# Patient Record
Sex: FEMALE | Race: OTHER
[De-identification: names, ages, dates, MRNs, and addresses within clinical notes are randomized per-mention and may not be internally consistent; named-entity substitution may affect disease eponyms.]

---

## 2022-01-07 ENCOUNTER — HOSPITAL ENCOUNTER (EMERGENCY)
Dept: HOSPITAL 15 - ER | Age: 52
Discharge: HOME | End: 2022-01-07
Payer: COMMERCIAL

## 2022-01-07 VITALS — WEIGHT: 160 LBS | BODY MASS INDEX: 29.44 KG/M2 | HEIGHT: 62 IN

## 2022-01-07 VITALS — SYSTOLIC BLOOD PRESSURE: 140 MMHG | DIASTOLIC BLOOD PRESSURE: 100 MMHG

## 2022-01-07 DIAGNOSIS — Z20.822: ICD-10-CM

## 2022-01-07 DIAGNOSIS — I10: Primary | ICD-10-CM

## 2022-01-07 PROCEDURE — 36415 COLL VENOUS BLD VENIPUNCTURE: CPT

## 2022-01-07 PROCEDURE — 87426 SARSCOV CORONAVIRUS AG IA: CPT

## 2025-02-27 ENCOUNTER — HOSPITAL ENCOUNTER (INPATIENT)
Dept: HOSPITAL 15 - ER | Age: 55
LOS: 2 days | Discharge: HOME | DRG: 48 | End: 2025-03-01
Attending: STUDENT IN AN ORGANIZED HEALTH CARE EDUCATION/TRAINING PROGRAM | Admitting: STUDENT IN AN ORGANIZED HEALTH CARE EDUCATION/TRAINING PROGRAM
Payer: MEDICAID

## 2025-02-27 VITALS — HEART RATE: 76 BPM | RESPIRATION RATE: 16 BRPM | OXYGEN SATURATION: 96 %

## 2025-02-27 VITALS
OXYGEN SATURATION: 95 % | RESPIRATION RATE: 18 BRPM | DIASTOLIC BLOOD PRESSURE: 96 MMHG | TEMPERATURE: 98.6 F | SYSTOLIC BLOOD PRESSURE: 152 MMHG | HEART RATE: 76 BPM

## 2025-02-27 VITALS — OXYGEN SATURATION: 96 % | RESPIRATION RATE: 20 BRPM | HEART RATE: 85 BPM

## 2025-02-27 VITALS — WEIGHT: 178.57 LBS | BODY MASS INDEX: 33.71 KG/M2 | HEIGHT: 61 IN

## 2025-02-27 DIAGNOSIS — Z79.899: ICD-10-CM

## 2025-02-27 DIAGNOSIS — E03.9: ICD-10-CM

## 2025-02-27 DIAGNOSIS — G90.9: Primary | ICD-10-CM

## 2025-02-27 DIAGNOSIS — Z88.8: ICD-10-CM

## 2025-02-27 DIAGNOSIS — E86.9: ICD-10-CM

## 2025-02-27 DIAGNOSIS — E87.6: ICD-10-CM

## 2025-02-27 DIAGNOSIS — E86.1: ICD-10-CM

## 2025-02-27 DIAGNOSIS — J01.90: ICD-10-CM

## 2025-02-27 DIAGNOSIS — I10: ICD-10-CM

## 2025-02-27 DIAGNOSIS — Z20.822: ICD-10-CM

## 2025-02-27 DIAGNOSIS — N39.0: ICD-10-CM

## 2025-02-27 DIAGNOSIS — E86.0: ICD-10-CM

## 2025-02-27 DIAGNOSIS — E11.9: ICD-10-CM

## 2025-02-27 LAB
ALBUMIN SERPL-MCNC: 4.8 G/DL (ref 3.2–4.8)
ALP SERPL-CCNC: 50 U/L (ref 46–116)
ALT SERPL-CCNC: 23 U/L (ref 7–40)
ANION GAP SERPL CALCULATED.3IONS-SCNC: 9 MMOL/L (ref 5–15)
BILIRUB SERPL-MCNC: 0.5 MG/DL (ref 0.2–1)
BUN SERPL-MCNC: 16 MG/DL (ref 9–23)
BUN/CREAT SERPL: 16.3 (ref 10–20)
CALCIUM SERPL-MCNC: 9.6 MG/DL (ref 8.7–10.4)
CHLORIDE SERPL-SCNC: 100 MMOL/L (ref 98–107)
CO2 SERPL-SCNC: 27 MMOL/L (ref 20–31)
GLUCOSE SERPL-MCNC: 178 MG/DL (ref 74–106)
HCT VFR BLD AUTO: 39.8 % (ref 36–46)
HGB BLD-MCNC: 13.6 G/DL (ref 12.2–16.2)
MAGNESIUM SERPL-MCNC: 2 MG/DL (ref 1.6–2.6)
MCH RBC QN AUTO: 31.3 PG (ref 28–32)
MCV RBC AUTO: 91.6 FL (ref 80–100)
NRBC BLD QL AUTO: 0.1 %
POTASSIUM SERPL-SCNC: 3.7 MMOL/L (ref 3.5–5.1)
PROT SERPL-MCNC: 7.6 G/DL (ref 5.7–8.2)
PROT UR STRIP.AUTO-MCNC: (no result) MG/DL
SARS-COV+SARS-COV-2 AG RESP QL IA.RAPID: NEGATIVE
SODIUM SERPL-SCNC: 136 MMOL/L (ref 136–145)
T4 FREE SERPL-MCNC: 0.41 NG/DL (ref 0.89–1.76)

## 2025-02-27 PROCEDURE — 81001 URINALYSIS AUTO W/SCOPE: CPT

## 2025-02-27 PROCEDURE — 87804 INFLUENZA ASSAY W/OPTIC: CPT

## 2025-02-27 PROCEDURE — 84443 ASSAY THYROID STIM HORMONE: CPT

## 2025-02-27 PROCEDURE — 96361 HYDRATE IV INFUSION ADD-ON: CPT

## 2025-02-27 PROCEDURE — 71045 X-RAY EXAM CHEST 1 VIEW: CPT

## 2025-02-27 PROCEDURE — 96360 HYDRATION IV INFUSION INIT: CPT

## 2025-02-27 PROCEDURE — 83036 HEMOGLOBIN GLYCOSYLATED A1C: CPT

## 2025-02-27 PROCEDURE — 85379 FIBRIN DEGRADATION QUANT: CPT

## 2025-02-27 PROCEDURE — 36415 COLL VENOUS BLD VENIPUNCTURE: CPT

## 2025-02-27 PROCEDURE — 80307 DRUG TEST PRSMV CHEM ANLYZR: CPT

## 2025-02-27 PROCEDURE — 83880 ASSAY OF NATRIURETIC PEPTIDE: CPT

## 2025-02-27 PROCEDURE — 87086 URINE CULTURE/COLONY COUNT: CPT

## 2025-02-27 PROCEDURE — 93306 TTE W/DOPPLER COMPLETE: CPT

## 2025-02-27 PROCEDURE — 82962 GLUCOSE BLOOD TEST: CPT

## 2025-02-27 PROCEDURE — 83735 ASSAY OF MAGNESIUM: CPT

## 2025-02-27 PROCEDURE — 85025 COMPLETE CBC W/AUTO DIFF WBC: CPT

## 2025-02-27 PROCEDURE — 83605 ASSAY OF LACTIC ACID: CPT

## 2025-02-27 PROCEDURE — 93005 ELECTROCARDIOGRAM TRACING: CPT

## 2025-02-27 PROCEDURE — 70450 CT HEAD/BRAIN W/O DYE: CPT

## 2025-02-27 PROCEDURE — 84480 ASSAY TRIIODOTHYRONINE (T3): CPT

## 2025-02-27 PROCEDURE — 84484 ASSAY OF TROPONIN QUANT: CPT

## 2025-02-27 PROCEDURE — 80048 BASIC METABOLIC PNL TOTAL CA: CPT

## 2025-02-27 PROCEDURE — 80053 COMPREHEN METABOLIC PANEL: CPT

## 2025-02-27 PROCEDURE — 87426 SARSCOV CORONAVIRUS AG IA: CPT

## 2025-02-27 PROCEDURE — 80320 DRUG SCREEN QUANTALCOHOLS: CPT

## 2025-02-27 PROCEDURE — 84439 ASSAY OF FREE THYROXINE: CPT

## 2025-02-27 RX ADMIN — SODIUM CHLORIDE ONE MLS/HR: 0.9 INJECTION, SOLUTION INTRAVENOUS at 15:36

## 2025-02-27 RX ADMIN — SODIUM CHLORIDE SCH MLS/HR: 0.9 INJECTION, SOLUTION INTRAVENOUS at 22:24

## 2025-02-27 NOTE — DVH
EXAM: CT HEAD WITHOUT CONTRAST



HISTORY: SYNCOPE AND COLLAPSE



COMPARISON: None



TECHNIQUE: Axial images were obtained and reformatted in coronal and sagittal planes.



All CT scans at this medical facility are performed using dose modulation techniques as appropriate t
o a performed exam including the following: Automated exposure control was utilized; adjustment of th
e MA and/or KV according to patient size; and use of iterative reconstruction technique. 



CT Dose: CTDI volume is 51.58 mGy. Dose-length product is 826.95 mGy*cm



FINDINGS:



Supratentorial Region:  No evidence for large acute territorial ischemia.  No intracranial hemorrhage
 is noted. 



Posterior Fossa:  No acute abnormality.



Brainstem:  Unremarkable.



Sellar/Suprasellar Region:  Mild diffuse paranasal sinus mucosal thickening.  Air-fluid levels are no
delvis in the frontal, sphenoid and maxillary sinuses.



Ventricles, Cisterns, Sulci:  Age-appropriate.



Orbits:  Unremarkable.



Paranasal Sinuses:  Unremarkable.



Mastoid Air Cells:  Unremarkable.



Vasculature:  Intracranial arterial calcified plaque formation noted.



Bones/Soft Tissues:  No acute abnormality.



Other:  None.



IMPRESSION: 



1. No acute intracranial process.



2. Acute sinusitis.



Electronically Signed by: Asia Barrera at 02/27/2025 16:14:02 PM

## 2025-02-27 NOTE — DVH
INDICATION: SYNCOPE AND COLLAPSE.



TECHNIQUE: Frontal view of the chest.



COMPARISON: None



FINDINGS: 



 The heart and mediastinal contours are grossly unremarkable.  There is no evidence of pleural diseas
e. The lungs are clear.    The bony structures of the chest are intact without fracture.



IMPRESSION: 



1. No evidence of acute disease.



Electronically Signed by: Cuba Solomon at 02/27/2025 15:47:52 PM

## 2025-02-27 NOTE — ED.PDOC
HPI Comments


HPI:


Poor Historian.





HPI:


53 y/o F, with PMHX of DM and HTN presents to the ED for CC of s/p syncopal 

episode. Patient states, she has been experiencing flu-like symptoms x1week. 

Patient endorses, new onset symptoms of body-aches, congestion and fatigue. 

Patient relays, symptoms worsened today (25) with her "passing out" and 

waking up on the floor. Patient has visible small contusion to her left chin.  

Patient denies chest pain, head injury, headache, shortness of breath, or N/V/D.

No other associated symptom's, modifiers, or sick contacts at this time. 


Patient states that she passed out for approximately 2 hours.  She was initially

was on the phone with her daughter earlier.











Initial Vital Signs:


Temp :98.9


BP: 169/86


HR:90


RR: 18


SpO2: 96





Past Medical History: DM, HTN, 








Past Surgical History: DENIES ANY








Social History:


Denies smoking, ETOH, or drug use.





Medications: ASPIRIN








Allergies:


NKDA

















 





REVIEW OF SYSTEMS:





CONSTITUTIONAL:





Denies acute: fever, diaphoresis, chills,  





HEAD:


Denies acute:  headache, photophobia





Eyes:


Denies acute: Double vision, vision loss, eye pain, eye discharge.





EARS: 


Denies acute: tinnitus, hearing loss, ear discharge, ear pain,





THROAT: 


Denies acute: sore throat, swelling, difficulty swallowing , pain with 

swallowing, change in  voice.





NECK:


Denies acute: neck pain, neck swelling, stiff neck.





HEART:


Denies acute : chest pain, palpitations,


 


LUNGS:


Denies acute: SOB, wheezing, cough, hemoptysis





ABDOMEN:


Denies acute: abdominal pain, Nausea, Vomiting, diarrhea, melena , hematemesis, 

hematochezia





SKIN:


Denies acute: rash, redness, lesions, itchiness. 





EXTREMITIES:


Denies acute: calf pain, numbness, tingling, weakness, denies pain in extremity.


Denies acute: Low back pain. 





Neuro:


Denies acute: focal neurological deficit, motor or sensory focal neurological 

deficit, tremors, seizure like activity, confusion,   change in mental status, 

loss of bowel or bladder function, cauda equina like symptoms.  





: 


Denies acute: dysuria, hematuria, flank pain, increase in urinary frequency.











PSYCH: 


Denies acute: hallucination, suicidal ideation, homicidal ideation.





FEMALE: 


Denies acute:  abnormal vaginal bleeding, foul odor, unusual discharge.  








PHYSICAL EXAM:


General: no acute distress, awake and alert.





Head: normocephalic, atraumatic.





Neck: 


supple, trachea is midline, no swelling. 


Cervical spine:  Palpation of the posterior midline of the cervical spine 

reveals no focal swelling, erythema, focal tenderness to palpation.  Patient has

normal range of motion. 


 





Throat:  Normal phonation.








Eyes:, no erythema, no purulent discharge, no proptosis, no icterus.


   


 


Heart:  regular rate, regular rhythm, no significant murmur appreciated.





Lungs: no apparent respiratory distress, 


   Able to speak in full sentences. 


No wheezing, no rhonchi, no crackles.  No stridors


   Clear to auscultation bilaterally.





Abdomen: non tender to palpation, non distended, soft, no guarding, no rebound, 

+ bowel sounds.











Neuro: Awake, Alert, oriented to name, self, situation, follows commands


 GCS=15.  


Speech is normal.





   


Skin: no petechia, no purpura, no cyanosis, non-pale, not jaundice. 





Lower extremities:  --no - Pitting edema


no deformity, no focal swelling, no calf TTP. 





Makes eye contact.





moves all four extremities.  


   





Face: no apparent facial droop.


 





Ambulating in the ED independently.








 


No nuchal rigidity, Kernig's sign, Brudzinski's sign, no meningeal signs.














_________________________________________


ED COURSE:


Time Seen by MD:  15:15


Primary Care Provider:  Joesph


Reviewed Notes:  Nurses Notes, Allergies


Allergies:  


Coded Allergies:  


     NO KNOWN ALLERGIES (Unverified , 22)


Information Source:  Patient





Was a procedure done?


Was a procedure done?:  No





CP Differential Dx


Differential Diagnosis:  N/A


Differential Diagnosis:  Other (Includes but not limited to thyroid disease, 

encephalopathy, electrolyte abnormality, sepsis, infection, intracranial 

pathology, drug adverse effects, arrhythmia, kidney insufficiency, ACS, CVA, 

malignancy, anemia)





X-Ray, Labs, Meds, VS





                                   Vital Signs








  Date Time  Temp Pulse Resp B/P (MAP) Pulse Ox O2 Delivery O2 Flow Rate FiO2


 


25 19:50  76 16  96 Room Air* 0 21


 


25 19:50 97.6 76 16 120/88 (99) 98   





 97.6       


 


25 18:45  80 15 115/94 (101) 97   


 


25 16:20 98.2 77 14 154/89 (110) 95   





 98.2       


 


25 15:56  85 20  96 Room Air* 0 21


 


25 15:24  81      


 


25 15:17 98.4 90 18 169/86 (113) 96   








                                       Lab








Test


 25


21:20 25


18:00 25


16:51 25


15:31 Range/Units


 


 


Urine Color Yellow     Yellow  


 


Urine Clarity Turbid H    Clear  


 


Urine pH 6.0     5.0-9.0  


 


Urine Specific Gravity 1.017     1.001-1.035  


 


Urine Protein Trace H    Negative  


 


Urine Ketones Negative     Negative  


 


Urine Blood Negative     Negative  /uL


 


Urine Nitrite Negative     Negative  


 


Urine Bilirubin Negative     Negative  


 


Urine Urobilinogen Normal     Negative  mg/dL


 


Urine Leukocyte Esterase 3+     Negative  /uL


 


Urine RBC 12     0 - 4  /hpf


 


Urine Microscopic WBC 53 H    0-5  /HPF


 


Urine Squamous Epithelial


Cells Few 


 


 


 


 <5  /hpf





 


Urine Bacteria Few H    None Seen  /hpf


 


Urine Hyaline Casts Mod     0 - 2  /lpf


 


Urine Mucus Few     None Seen  


 


Urine Glucose 3+ H    Normal  mg/dL


 


Urine Opiates Screen Neg     NEGATIVE  


 


Urine Fentanyl Screen Neg     NEGATIVE  


 


Urine Barbiturates Screen Neg     NEGATIVE  


 


Urine Phencyclidine Screen Neg     NEGATIVE  


 


Urine Amphetamines Screen Neg     NEGATIVE  


 


Urine Benzodiazepines Screen Neg     NEGATIVE  


 


Urine Cocaine Screen Neg     NEGATIVE  


 


Urine Cannabinoids Screen Neg     NEGATIVE  


 


D-Dimer, Quantitative


 


 0.23 


 


 


 0.0-0.49  mg/L


FEU


 


Troponin I High Sensitivity  < 3 L < 3 L < 3 L </=34  ng/L


 


Thyroid Stimulating Hormone


(TSH) 


 59.48 H


 


 


 0.55-4.78


uIU/mL


 


White Blood Count


 


 


 


 5.8 


 4.4-10.8


10^3/uL


 


Red Blood Count


 


 


 


 4.34 


 4.0-5.20


10^6/uL


 


Hemoglobin    13.6  12.2-16.2  g/dL


 


Hematocrit    39.8  36.0-46.0  %


 


Mean Corpuscular Volume    91.6  80.0-100.0  fL


 


Mean Corpuscular Hemoglobin    31.3  28.0-32.0  pg


 


Mean Corpuscular Hemoglobin


Concent 


 


 


 34.1 


 32.0-36.0  g/dL





 


Red Cell Distribution Width    12.8  11.8-14.3  %


 


Platelet Count


 


 


 


 194 


 140-450


10^3/uL


 


Mean Platelet Volume    9.5  6.9-10.8  fL


 


Neutrophils (%) (Auto)    52.1  37.0-80.0  %


 


Lymphocytes (%) (Auto)    38.1  10.0-50.0  %


 


Monocytes (%) (Auto)    8.4  0.0-12.0  %


 


Eosinophils (%) (Auto)    0.9  0.0-7.0  %


 


Basophils (%) (Auto)    0.5  0.0-2.0  %


 


Neutrophils # (Auto)


 


 


 


 3.0 


 1.6-8.6  10


^3/uL


 


Lymphocytes # (Auto)


 


 


 


 2.2 


 0.4-5.4  10


^3/uL


 


Monocytes # (Auto)    0.5  0-1.3  10 ^3/uL


 


Eosinophils # (Auto)    0.1  0-0.8  10 ^3/uL


 


Basophils # (Auto)    0  0-0.2  10 ^3/uL


 


Nucleated Red Blood Cells    0.1   %


 


Sodium Level    136  136-145  mmol/L


 


Potassium Level    3.7  3.5-5.1  mmol/L


 


Chloride Level    100    mmol/L


 


Carbon Dioxide Level    27  20-31  mmol/L


 


Anion Gap    9  5-15  


 


Blood Urea Nitrogen    16  9-23  mg/dL


 


Creatinine


 


 


 


 0.98 


 0.550-1.02


mg/dL


 


Glomerular Filtration Rate


Calc 


 


 


 68 


 >90  mL/min





 


BUN/Creatinine Ratio    16.3  10.0-20.0  


 


Serum Glucose    178 H   mg/dL


 


Lactic Acid Level    1.2  0.4-2.0  mmol/L


 


Calcium Level    9.6  8.7-10.4  mg/dL


 


Magnesium Level    2.0  1.6-2.6  mg/dL


 


Total Bilirubin    0.5  0.2-1.0  mg/dL


 


Aspartate Amino Transferase


(AST) 


 


 


 30 


 13-40  U/L





 


Alanine Aminotransferase (ALT)    23  7-40  U/L


 


Alkaline Phosphatase    50    U/L


 


B-Type Natriuretic Peptide    8.04  0-100  pg/mL


 


Total Protein    7.6  5.7-8.2  g/dL


 


Albumin    4.8  3.2-4.8  g/dL


 


Test


 25


15:16 25


15:14 


 


 Range/Units


 


 


Influenza Type A Antigen Negative     Negative  


 


Influenza Type B Antigen Negative     Negative  


 


SARS-CoV-2 Antigen (Rapid) Negative     NEGATIVE  


 


POC Glucose  202 H     mg/dl








                               Current Medications








 Medications


  (Trade)  Dose


 Ordered  Sig/Crystal


 Route  Start Time


 Stop Time Status Last Admin


 


 Sodium Chloride  1,000 ml @ 


 1,000 mls/hr  Q1H ONCE


 IV  25 15:15


 25 16:14 DC 25 15:36








[place] [address]


                              Ph: [phone]





                               DIAGNOSTIC IMAGING


                      Diagnostic Imaging Report : [id number]


                                     Signed








PATIENT: DALTON MASON    ACCT: [id number]        UNIT: [id number]


: 1970           LOC: ER                   ROOM / BED:  / 


AGE / SEX: 54 / F         ADM STATUS: REG ER        SERVICE DT: 25 1509





ORDERING PHYSICIAN: MIRTHA VARGAS DO


PROCEDURE(s): CXRP - CHEST PORTABLE


REASON: SYNCOPE AND COLLAPSE. 


ORDER NUMBER(s): [id number], ACCESSION NUMBER(s): [id number]








INDICATION: SYNCOPE AND COLLAPSE.





TECHNIQUE: Frontal view of the chest.





COMPARISON: None





FINDINGS: 





 The heart and mediastinal contours are grossly unremarkable.  There is no 

evidence of pleural disease. The lungs are clear.    The bony structures of the 

chest are intact without fracture.





IMPRESSION: 





1. No evidence of acute disease.





Electronically Signed by: Astrid Novak at 2025 15:47:52 PM











DICTATED BY: ASTRID NOVAK MD


DICTATED DATE/TIME: 25 1547





SIGNED BY: ASTRID NOVAK MD


SIGNED DATE/TIME: 25 1547





CC: 





                             [place] [address]


                              Ph: [phone]





                               DIAGNOSTIC IMAGING


                      Diagnostic Imaging Report : [id number]


                                     Signed








PATIENT: DALTON MASON    ACCT: [id number]        UNIT: [id number]


: 1970           LOC: ER                   ROOM / BED:  / 


AGE / SEX: 54 / F         ADM STATUS: REG ER        SERVICE DT: 25 1509





ORDERING PHYSICIAN: MIRTHA VARGAS DO


PROCEDURE(s): HWOCT - HEAD WITHOUT CONTRAST


REASON: SYNCOPE AND COLLAPSE


ORDER NUMBER(s): [id number], ACCESSION NUMBER(s): [id number]








EXAM: CT HEAD WITHOUT CONTRAST





HISTORY: SYNCOPE AND COLLAPSE





COMPARISON: None





TECHNIQUE: Axial images were obtained and reformatted in coronal and sagittal 

planes.





All CT scans at this medical facility are performed using dose modulation 

techniques as appropriate to a performed exam including the following: Automated

exposure control was utilized; adjustment of the MA and/or KV according to 

patient size; and use of iterative reconstruction technique. 





CT Dose: CTDI volume is 51.58 mGy. Dose-length product is 826.95 mGy*cm





FINDINGS:





Supratentorial Region:  No evidence for large acute territorial ischemia.  No 

intracranial hemorrhage is noted. 





Posterior Fossa:  No acute abnormality.





Brainstem:  Unremarkable.





Sellar/Suprasellar Region:  Mild diffuse paranasal sinus mucosal thickening.  

Air-fluid levels are noted in the frontal, sphenoid and maxillary sinuses.





Ventricles, Cisterns, Sulci:  Age-appropriate.





Orbits:  Unremarkable.





Paranasal Sinuses:  Unremarkable.





Mastoid Air Cells:  Unremarkable.





Vasculature:  Intracranial arterial calcified plaque formation noted.





Bones/Soft Tissues:  No acute abnormality.





Other:  None.





IMPRESSION: 





1. No acute intracranial process.





2. Acute sinusitis.





Electronically Signed by: Asia Barrera at 2025 16:14:02 PM











DICTATED BY: ASIA FELDMAN MD


DICTATED DATE/TIME: 25 161





SIGNED BY: ASIA FELDMAN MD


SIGNED DATE/TIME: 25 1614





CC: 


Time of 1ST Reevaluation:  15:45


Reevaluation 1ST:  Improved


Patient Education/Counseling:  Diagnosis, Treatment


Family Education/Counseling:  Other


Comments


Patient presented with the above HPI.--- S/P SYNCOPAL EPISODE ---workup was 

initiated. patient was found with the above mentioned diagnosis. 





the following medications were ordered:   


 please refer to order lists of meds and tests obtained by myself Dr. Vargas. 





Patient ED course and VS have been stabilized. Patient has been reassessed in 

the ED and remained in a stable condition.  





Pertinent incidental findings were discussed with the patient and/or family. 





Patient/family voices understanding and is agreeable with plan. 





Patient has been observed in the ED adequate length of time to insure 

improvement/stability. 





Escalation of care considered:


Consideration of escalation to observation or admission





Patient was ADMITTED to the medicine team for further evaluation and treatment 

of their presentation.


 





All the reports of any imaging studies that were ordered by myself were reviewed

by myself.





Departure 1


Departure


Time of Disposition:  15:20


Impression:  


   Primary Impression:  


   Syncope and collapse


   Additional Impression:  


   Acute sinusitis


Disposition:   ADMITTED AS INPATIENT


Admit to:  Tele


Condition:  Guarded





Additional Instructions:  


[place] [address]


Ph: [phone]





DIAGNOSTIC IMAGING


Diagnostic Imaging Report : [id number]


Signed








PATIENT: DALTON MASON   ACCT: [id number]   UNIT: [id number]


: 1970   LOC: ER   ROOM / BED:  / 


AGE / SEX: 54 / F   ADM STATUS: REG ER   SERVICE DT: 25 1509





ORDERING PHYSICIAN: MIRTHA VARGAS DO


PROCEDURE(s): CXRP - CHEST PORTABLE


REASON: SYNCOPE AND COLLAPSE. 


ORDER NUMBER(s): [id number], ACCESSION NUMBER(s): [id number]








INDICATION: SYNCOPE AND COLLAPSE.





TECHNIQUE: Frontal view of the chest.





COMPARISON: None





FINDINGS: 





 The heart and mediastinal contours are grossly unremarkable.  There is no 

evidence of pleural disease. The lungs are clear.    The bony structures of the 

chest are intact without fracture.





IMPRESSION: 





1. No evidence of acute disease.





Electronically Signed by: Astrid Novak at 2025 15:47:52 PM











DICTATED BY: ASTRID NOVAK MD


DICTATED DATE/TIME: 25 1547





SIGNED BY: ASTRID NOVAK MD


SIGNED DATE/TIME: 25 1547





CC: 





[place] [address]


Ph: [phone]





DIAGNOSTIC IMAGING


Diagnostic Imaging Report : [id number]


Signed








PATIENT: DALTON MASON   ACCT: [id number]   UNIT: [id number]


: 1970   LOC: ER   ROOM / BED:  / 


AGE / SEX: 54 / F   ADM STATUS: REG ER   SERVICE DT: 25 1509





ORDERING PHYSICIAN: MIRTHA VARGAS DO


PROCEDURE(s): HWOCT - HEAD WITHOUT CONTRAST


REASON: SYNCOPE AND COLLAPSE


ORDER NUMBER(s): [id number], ACCESSION NUMBER(s): [id number]








EXAM: CT HEAD WITHOUT CONTRAST





HISTORY: SYNCOPE AND COLLAPSE





COMPARISON: None





TECHNIQUE: Axial images were obtained and reformatted in coronal and sagittal 

planes.





All CT scans at this medical facility are performed using dose modulation 

techniques as appropriate to a performed exam including the following: Automated

exposure control was utilized; adjustment of the MA and/or KV according to 

patient size; and use of iterative reconstruction technique. 





CT Dose: CTDI volume is 51.58 mGy. Dose-length product is 826.95 mGy*cm





FINDINGS:





Supratentorial Region:  No evidence for large acute territorial ischemia.  No 

intracranial hemorrhage is noted. 





Posterior Fossa:  No acute abnormality.





Brainstem:  Unremarkable.





Sellar/Suprasellar Region:  Mild diffuse paranasal sinus mucosal thickening.  

Air-fluid levels are noted in the frontal, sphenoid and maxillary sinuses.





Ventricles, Cisterns, Sulci:  Age-appropriate.





Orbits:  Unremarkable.





Paranasal Sinuses:  Unremarkable.





Mastoid Air Cells:  Unremarkable.





Vasculature:  Intracranial arterial calcified plaque formation noted.





Bones/Soft Tissues:  No acute abnormality.





Other:  None.





IMPRESSION: 





1. No acute intracranial process.





2. Acute sinusitis.





Electronically Signed by: Asia Barrera at 2025 16:14:02 PM











DICTATED BY: ASIA FELDMAN MD


DICTATED DATE/TIME: 25 161





SIGNED BY: ASIA FELDMAN MD


SIGNED DATE/TIME: 25 161





CC:


Discharged With:  Self





Critical Care Note


Critical Care Time?:  No





Heart Score


Heart Score:  








Heart Score Response (Comments) Value


 


History Slightly Suspicious 0


 


EKG Normal 0


 


Age                                     45-64 1


 


Risk Factors >3 or Hx ASHD 2


 


Troponin Normal limit 0


 


Total  3














I personally scribed for MIRTHA VARGAS DO ([email or web address]) on 25 at 15:16.  

Electronically submitted by Emily Reyes ([email or web address]).


I personally scribed for MIRTHA VARGAS DO (DVFARMI) on 25 at 15:17.  

Electronically submitted by Emily Reyes ([email or web address]).


I personally scribed for SAM,MIRTHA J DO (DVFARMI) on 25 at 15:23.  

Electronically submitted by Emily Reyes ([email or web address]).


I personally scribed for SAM,MIRTHA J DO (DVFARMI) on 25 at 15:28.  

Electronically submitted by Emily Reyes ([email or web address]).


I personally scribed for SAM,MIRTHA J DO (DVFARMI) on 25 at 15:34.  El

ectronically submitted by Emily Reyes ([email or web address]).


I personally scribed for SAM,MIRTHA J DO (DVFARMI) on 25 at 15:55.  Electr

onically submitted by Emily Reyes ([email or web address]).


I personally scribed for SAM,MIRTHA J DO (DVFARMI) on 25 at 17:01.  

Electronically submitted by Emily Reyes ([email or web address]).


I personally scribed for SAM,MIRTHA J DO (DVFARMI) on 25 at 17:02.  

Electronically submitted by Emily Reyes ([email or web address]).


I personally scribed for SAM,MIRTHA J DO (DVFARMI) on 25 at 17:10.  

Electronically submitted by Emily Reyes ([email or web address]).


I personally scribed for SAM,MIRTHA J DO (DVFARMI) on 25 at 18:25.  

Electronically submitted by Emily Reyes ([email or web address]).





SAM,MIRTHA J DO                2025 15:16

## 2025-02-28 VITALS
DIASTOLIC BLOOD PRESSURE: 75 MMHG | RESPIRATION RATE: 20 BRPM | HEART RATE: 71 BPM | OXYGEN SATURATION: 97 % | TEMPERATURE: 98.6 F | SYSTOLIC BLOOD PRESSURE: 121 MMHG

## 2025-02-28 VITALS — DIASTOLIC BLOOD PRESSURE: 85 MMHG | HEART RATE: 78 BPM | SYSTOLIC BLOOD PRESSURE: 145 MMHG

## 2025-02-28 VITALS
HEART RATE: 75 BPM | OXYGEN SATURATION: 97 % | DIASTOLIC BLOOD PRESSURE: 80 MMHG | RESPIRATION RATE: 18 BRPM | SYSTOLIC BLOOD PRESSURE: 138 MMHG | TEMPERATURE: 98.3 F

## 2025-02-28 VITALS — HEART RATE: 79 BPM

## 2025-02-28 VITALS
SYSTOLIC BLOOD PRESSURE: 142 MMHG | TEMPERATURE: 98 F | OXYGEN SATURATION: 98 % | RESPIRATION RATE: 18 BRPM | HEART RATE: 68 BPM | DIASTOLIC BLOOD PRESSURE: 82 MMHG

## 2025-02-28 VITALS
RESPIRATION RATE: 18 BRPM | HEART RATE: 75 BPM | SYSTOLIC BLOOD PRESSURE: 142 MMHG | DIASTOLIC BLOOD PRESSURE: 69 MMHG | OXYGEN SATURATION: 98 % | TEMPERATURE: 98.1 F

## 2025-02-28 VITALS
HEART RATE: 78 BPM | DIASTOLIC BLOOD PRESSURE: 66 MMHG | TEMPERATURE: 98.2 F | SYSTOLIC BLOOD PRESSURE: 110 MMHG | RESPIRATION RATE: 18 BRPM | OXYGEN SATURATION: 95 %

## 2025-02-28 VITALS — SYSTOLIC BLOOD PRESSURE: 151 MMHG | HEART RATE: 80 BPM | DIASTOLIC BLOOD PRESSURE: 87 MMHG

## 2025-02-28 VITALS
HEART RATE: 83 BPM | RESPIRATION RATE: 18 BRPM | OXYGEN SATURATION: 95 % | TEMPERATURE: 98.1 F | DIASTOLIC BLOOD PRESSURE: 79 MMHG | SYSTOLIC BLOOD PRESSURE: 143 MMHG

## 2025-02-28 VITALS — HEART RATE: 81 BPM | SYSTOLIC BLOOD PRESSURE: 152 MMHG | DIASTOLIC BLOOD PRESSURE: 90 MMHG

## 2025-02-28 VITALS — HEART RATE: 69 BPM

## 2025-02-28 LAB
ALBUMIN SERPL-MCNC: 4.1 G/DL (ref 3.2–4.8)
ALP SERPL-CCNC: 42 U/L (ref 46–116)
ALT SERPL-CCNC: 15 U/L (ref 7–40)
ANION GAP SERPL CALCULATED.3IONS-SCNC: 10 MMOL/L (ref 5–15)
BILIRUB SERPL-MCNC: 0.4 MG/DL (ref 0.2–1)
BUN SERPL-MCNC: 10 MG/DL (ref 9–23)
BUN/CREAT SERPL: 13 (ref 10–20)
CALCIUM SERPL-MCNC: 8.7 MG/DL (ref 8.7–10.4)
CHLORIDE SERPL-SCNC: 104 MMOL/L (ref 98–107)
CO2 SERPL-SCNC: 24 MMOL/L (ref 20–31)
GLUCOSE SERPL-MCNC: 165 MG/DL (ref 74–106)
HCT VFR BLD AUTO: 36.8 % (ref 36–46)
HGB BLD-MCNC: 12.5 G/DL (ref 12.2–16.2)
MCH RBC QN AUTO: 31.2 PG (ref 28–32)
MCV RBC AUTO: 91.5 FL (ref 80–100)
NRBC BLD QL AUTO: 0.2 %
POTASSIUM SERPL-SCNC: 3.4 MMOL/L (ref 3.5–5.1)
PROT SERPL-MCNC: 6.7 G/DL (ref 5.7–8.2)
SODIUM SERPL-SCNC: 138 MMOL/L (ref 136–145)

## 2025-02-28 RX ADMIN — CEFTRIAXONE SODIUM ONE MLS/HR: 1 INJECTION, POWDER, FOR SOLUTION INTRAMUSCULAR; INTRAVENOUS at 00:26

## 2025-02-28 RX ADMIN — ACETAMINOPHEN PRN MG: 325 TABLET ORAL at 19:22

## 2025-02-28 RX ADMIN — SODIUM CHLORIDE SCH MLS/HR: 0.9 INJECTION, SOLUTION INTRAVENOUS at 06:08

## 2025-02-28 RX ADMIN — ENOXAPARIN SODIUM SCH MG: 40 INJECTION SUBCUTANEOUS at 09:06

## 2025-02-28 RX ADMIN — Medication SCH STRIP: at 14:50

## 2025-02-28 RX ADMIN — HYDRALAZINE HYDROCHLORIDE ONE MG: 20 INJECTION INTRAMUSCULAR; INTRAVENOUS at 01:14

## 2025-02-28 RX ADMIN — Medication ONE MG: at 20:19

## 2025-02-28 RX ADMIN — PANTOPRAZOLE SODIUM ONE MG: 40 TABLET, DELAYED RELEASE ORAL at 20:19

## 2025-02-28 RX ADMIN — POTASSIUM CHLORIDE ONE MEQ: 1500 TABLET, EXTENDED RELEASE ORAL at 11:12

## 2025-02-28 RX ADMIN — HUMAN INSULIN SCH UNITS: 100 INJECTION, SOLUTION SUBCUTANEOUS at 14:53

## 2025-02-28 RX ADMIN — LEVOTHYROXINE SODIUM SCH MCG: 50 TABLET ORAL at 06:07

## 2025-02-28 RX ADMIN — CEFTRIAXONE SODIUM SCH MLS/HR: 1 INJECTION, POWDER, FOR SOLUTION INTRAMUSCULAR; INTRAVENOUS at 20:19

## 2025-02-28 NOTE — ECG
[facility]

                                       

Test Date:    2025               Test Time:    15:24:11

Pat Name:     DALTON MASON            Department:   ER

Patient ID:   [id number]           Room:         [facility]T A

Gender:       F                        Technician:   CHU

:          1970               Requested By: MIRTHA VARGAS

Order Number: [id number]        Reading MD:   Feliberto Holt

                                 Measurements

Intervals                              Axis          

Rate:         81                       P:            42

TX:           143                      QRS:          5

QRSD:         77                       T:            55

QT:           380                                    

QTc:          441                                    

                           Interpretive Statements

Sinus rhythm

Anteroseptal infarct, old

Electronically Signed On 3-1-2025 17:47:52 PST by Feliberto Holt



Please click the below link to view image of tracing.

## 2025-02-28 NOTE — DVHSR
--------------- APPROVED REPORT --------------





EXAM: Two-dimensional and M-mode echocardiogram with Doppler and color Doppler.



Blood Pressure: 142/69 mmHg



INDICATION

R/O structural heart disease



RISK FACTORS

Height: 5'1", Weight: 146



DIMENSIONS 

LVDd3.4 (3.8-5.7cm)LA (2D)3.0 (1.9-4.0cm)Aortic Root3.1 (2.0-3.7cm)

LVDs1.6 (2.5-4.0cm)LA (MM) (1.9-4.0cm)Aortic Cusp Exc1.7 (1.5-2.0cm)

EF (%) 84.0 (55-70%)Rt. Atrium2.8 (1.9-4.0cm)Asc. Aorta3.2 cm

IVSd1.3 (0.7-1.1cm)RV (D)3.0 (1.8-2.4cm)

PWd1.1 (0.7-1.1cm)



Mitral Valve

MitralMitral Stenosis

E wave0.66m/sMV Mean GR.mmHg

A wave0.79m/sMV Peak GR.mmHg

E/A ratio0.82D MVAcm2

DECEL [id number] 1/2 Timems



Aortic Valve

Aortic ValveAortic Stenosis

V10.93m/Ladonna Mean GR.7mmHg

V22.07m/Ladonna Peak GR.17mmHg

LVOT Diameter2.0 (1.8-2.4cm)Doppler AVA1.41cm2



Pulmonic Valve

V20.76m/s



Tricuspid Valve

TR Velocity2.13m/s

[id number]



Conclusion

Technically good study.  Sinus rhythm.  

Mild aortic root enlargement with dilation of the sinuses of Valsalva.  Notable concentric LVH.

The mitral and aortic valves are otherwise structurally normal.  The tricuspid and pulmonic are hiro
l.

Left ventricular systolic performance is preserved at 60% with normal RV function.  

Mild TR.  

No pericardial effusion masses or vegetations.

## 2025-02-28 NOTE — DVHHPRES
History of Present Illness


Resident Creating Document:  LIA GAMINO RESIDENT


History of Present Illness


Zac Putnam 54 years old female with a PMH of type 2 DM, HTN, hypothyroidism 

presented to the ED with the chief complaints of syncopal episode.  Patient 

reported since Sunday patient has been having mild nausea body aches and 

tiredness but yesterday patient was walking and suddenly she fainted without any

prodromal symptoms patient is exactly unable to recall how much time she was 

fainted approximately 2 hours and hit her chin.  Patient also reported she has a

history of fainting during her pregnancy at that time she has some prodromal 

symptoms but not this time.  Patient is more consent so visited ED. patient also

reported she has been experiencing flu-like symptoms for 1 week.  On my 

assessment patient denies fever, chest pain, shortness of breath, palpitations, 

nausea, vomiting, heterogeneous and other acute associated symptoms.








PMH:  Type 2 DM, HTN, hypothyroidism on levothyroxine 125 mg 


PSH:  Denies


Social history:  Lives with family.  Denies smoking, alcohol and other drug 

abuse 


Family history:  Reviewed, noncontributory


Allergies:  No known allergies 


Home medications:  Hydrochlorothiazide, amlodipine but unknown doses.  

Levothyroxine 125 mg





Review of Systems


Constitutional:  No: Fever, Chills, Sweats, Weakness, Malaise, Other


Eyes:  No: Pain, Vision change, Conjunctivae inflammation, Eyelid inflammation, 

Other, Redness


ENT:  Nose congestion


Respiratory:  No: Cough, Dry, Shortness of breath, SOB with excertion, Wheezing,

Hemoptysis, Pleuritic Pain, Sputum, Wheezing, Other


Cardiovascular:  No: Chest Pain, Palpitations, Orthopnea, Paroxysmal Noc. 

Dyspnea, Edema, Lt Headedness, Other


Gastrointestinal:  No: Nausea, Vomiting, Abdominal Pain, Diarrhea, Constipation,

Melena, Hematochezia, Other


Genitourinary:  No Dysuria, No Frequency, No Incontinence, No Hematuria, No 

Retention, No Other


Musculoskeletal:  No: other, neck pain, shoulder pain, arm pain, back pain, hand

pain, leg pain, foot pain


Skin:  No: Rash, Lesions, Jaundice, Bruising, Other


Neurological:  No: Weakness, Numbness, Incoordination, Change in speech, 

Confusion, Seizures, Other


Allergies:  


Coded Allergies:  


     NO KNOWN ALLERGIES (Unverified , 1/7/22)


Medications





                               Current Medications








 Medications  Dose


 Ordered  Sig/Crystal


 Route  Start Time


 Stop Time Status Last Admin


Dose Admin


 


 Sodium Chloride  1,000 ml @ 


 120 mls/hr  Q8H20M


 IV  2/27/25 22:00


    2/27/25 22:24


120 MLS/HR


 


 Enoxaparin Sodium  40 mg  DAILY


 SC  2/28/25 10:00


     





 


 Acetaminophen  650 mg  Q6HP  PRN


 PO  2/27/25 22:00


     





 


 Nitroglycerin  0.4 mg  Q5MINP  PRN


 SL  2/27/25 22:00


     





 


 Morphine Sulfate  2 mg  Q30M  PRN


 IV  2/27/25 22:00


     





 


 Ceftriaxone Sodium  50 ml @ 


 100 mls/hr  DAILY@2100


 IV  2/28/25 21:00


     














Exam


Vital Signs





Vital Signs








  Date Time  Temp Pulse Resp B/P (MAP) Pulse Ox O2 Delivery O2 Flow Rate FiO2


 


2/27/25 22:57 98.6 76 16 152/96 (114) 95   





 98.6       


 


2/27/25 22:57      Room Air* 0 21








Exam


Pt is lying on bed





General Appearance:  Alert, Oriented X3, Cooperative, Not in acute distress


HEENT:  Atraumatic, Mucous membranes moist/pink


Respiratory:  Clear to auscultation, Normal air movement


Cardiovascular:  Regular rate, Normal S1, Normal S2, No murmurs


Abdominal:  Mild suprapubic tenderness.  Active bowel sounds, Soft, no 

distention, no tendernes


Extremities:  No edema, Normal pulses, No tenderness/swelling


Skin:  No   Significant rash, except past surgical scars


Neuro:  Normal speech,  sensorimotor deficits none


Psych/Mental Status:  Mental status NL, Mood NL


Nurse was there as sharperone during examination





Labs/Xrays





                                      Labs








Test


 2/27/25


21:57 2/27/25


21:20 2/27/25


18:00 2/27/25


15:31 Range/Units


 


 


Free Thyroxine (T4) Calculated


 0.41 L


 


 


 


 0.89-1.76


ng/dL


 


Total Triiodothyronine (TT3)


 0.60 


 


 


 


 0.60-1.81


ng/mL


 


Plasma/Serum Blood Alcohol < 3.0     <10  mg/dL


 


Urine Color  Yellow    Yellow  


 


Urine Clarity  Turbid H   Clear  


 


Urine pH  6.0    5.0-9.0  


 


Urine Specific Gravity  1.017    1.001-1.035  


 


Urine Protein  Trace H   Negative  


 


Urine Ketones  Negative    Negative  


 


Urine Blood  Negative    Negative  /uL


 


Urine Nitrite  Negative    Negative  


 


Urine Bilirubin  Negative    Negative  


 


Urine Urobilinogen  Normal    Negative  mg/dL


 


Urine Leukocyte Esterase  3+    Negative  /uL


 


Urine RBC  12    0 - 4  /hpf


 


Urine Microscopic WBC  53 H   0-5  /HPF


 


Urine Squamous Epithelial


Cells 


 Few 


 


 


 <5  /hpf





 


Urine Bacteria  Few H   None Seen  /hpf


 


Urine Hyaline Casts  Mod    0 - 2  /lpf


 


Urine Mucus  Few    None Seen  


 


Urine Glucose  3+ H   Normal  mg/dL


 


Urine Opiates Screen  Neg    NEGATIVE  


 


Urine Fentanyl Screen  Neg    NEGATIVE  


 


Urine Barbiturates Screen  Neg    NEGATIVE  


 


Urine Phencyclidine Screen  Neg    NEGATIVE  


 


Urine Amphetamines Screen  Neg    NEGATIVE  


 


Urine Benzodiazepines Screen  Neg    NEGATIVE  


 


Urine Cocaine Screen  Neg    NEGATIVE  


 


Urine Cannabinoids Screen  Neg    NEGATIVE  


 


D-Dimer, Quantitative


 


 


 0.23 


 


 0.0-0.49  mg/L


FEU


 


Troponin I High Sensitivity   < 3 L  </=34  ng/L


 


Thyroid Stimulating Hormone


(TSH) 


 


 59.48 H


 


 0.55-4.78


uIU/mL


 


White Blood Count


 


 


 


 5.8 


 4.4-10.8


10^3/uL


 


Red Blood Count


 


 


 


 4.34 


 4.0-5.20


10^6/uL


 


Hemoglobin    13.6  12.2-16.2  g/dL


 


Hematocrit    39.8  36.0-46.0  %


 


Mean Corpuscular Volume    91.6  80.0-100.0  fL


 


Mean Corpuscular Hemoglobin    31.3  28.0-32.0  pg


 


Mean Corpuscular Hemoglobin


Concent 


 


 


 34.1 


 32.0-36.0  g/dL





 


Red Cell Distribution Width    12.8  11.8-14.3  %


 


Platelet Count


 


 


 


 194 


 140-450


10^3/uL


 


Mean Platelet Volume    9.5  6.9-10.8  fL


 


Neutrophils (%) (Auto)    52.1  37.0-80.0  %


 


Lymphocytes (%) (Auto)    38.1  10.0-50.0  %


 


Monocytes (%) (Auto)    8.4  0.0-12.0  %


 


Eosinophils (%) (Auto)    0.9  0.0-7.0  %


 


Basophils (%) (Auto)    0.5  0.0-2.0  %


 


Neutrophils # (Auto)


 


 


 


 3.0 


 1.6-8.6  10


^3/uL


 


Lymphocytes # (Auto)


 


 


 


 2.2 


 0.4-5.4  10


^3/uL


 


Monocytes # (Auto)    0.5  0-1.3  10 ^3/uL


 


Eosinophils # (Auto)    0.1  0-0.8  10 ^3/uL


 


Basophils # (Auto)    0  0-0.2  10 ^3/uL


 


Nucleated Red Blood Cells    0.1   %


 


Sodium Level    136  136-145  mmol/L


 


Potassium Level    3.7  3.5-5.1  mmol/L


 


Chloride Level    100    mmol/L


 


Carbon Dioxide Level    27  20-31  mmol/L


 


Anion Gap    9  5-15  


 


Blood Urea Nitrogen    16  9-23  mg/dL


 


Creatinine


 


 


 


 0.98 


 0.550-1.02


mg/dL


 


Glomerular Filtration Rate


Calc 


 


 


 68 


 >90  mL/min





 


BUN/Creatinine Ratio    16.3  10.0-20.0  


 


Serum Glucose    178 H   mg/dL


 


Lactic Acid Level    1.2  0.4-2.0  mmol/L


 


Calcium Level    9.6  8.7-10.4  mg/dL


 


Magnesium Level    2.0  1.6-2.6  mg/dL


 


Total Bilirubin    0.5  0.2-1.0  mg/dL


 


Aspartate Amino Transferase


(AST) 


 


 


 30 


 13-40  U/L





 


Alanine Aminotransferase (ALT)    23  7-40  U/L


 


Alkaline Phosphatase    50    U/L


 


B-Type Natriuretic Peptide    8.04  0-100  pg/mL


 


Total Protein    7.6  5.7-8.2  g/dL


 


Albumin    4.8  3.2-4.8  g/dL


 


Test


 2/27/25


15:16 2/27/25


15:14 


 


 Range/Units


 


 


Influenza Type A Antigen Negative     Negative  


 


Influenza Type B Antigen Negative     Negative  


 


SARS-CoV-2 Antigen (Rapid) Negative     NEGATIVE  


 


POC Glucose  202 H     mg/dl











Assessment/Plan


Assessment/Plan


# Syncope


# ? [employer]  Imbalance


# mechanical fall with a LOC


-CT showed no acute changes


-monitor on telemetry


-currently on IVF


-orthostatic vitals negative





# acute sinusitis


-clinical and CT evident


-currently on Rocephin





# Acute complicated UTI


- evident on urinalysis


- ordered urine bacterial culture


- currently giving Rocephin





# hypertensive urgency


-closely monitor


-hydralazine p.r.n. 10 mg until patient gets home meds





# hypothyroidism


-elevated TSH and low free T4


-levothyroxine 125 mg as home dose


-outpatient follow up to change thyroxine dose








PUD PPX: not indicated


VTE PPX:  Patient is ambulatory


Diet:  Cardiac diet and diabetic diet





Goals of care discussed with the patient for more than 29 minutes: Full code 

status 


Case discussed with Dr. Griffin, patient and nurse


Plan discussed with:  Patient


My Orders





                          Orders - LIA GAMINO RESIDENT








Procedure Category Date Status





  Time 


 


Admit ADMIT 2/27/25 Transmitted





  21:46 


 


Allergies JANINA 2/27/25 In Process





  21:46 


 


Code Status CODE 2/27/25 Transmitted





  21:46 


 


Sodium Chloride 0.9% PHA 2/27/25 In Process





  22:00 


 


Enoxaparin Sodium PHA 2/28/25 In Process





 (Lovenox)  10:00 


 


Complete Blood Count LAB 2/28/25 Logged





  04:00 


 


Comprehensive LAB 2/28/25 Logged





Metabolic Panel  04:00 


 


Cardiac DIET 2/28/25 Transmitted





Diet-2gna,Lofat,Lochol  Breakfast 


 


Echo 2d Mode Cardiac US 2/27/25 Logged





DOP  21:46 


 


Condition: Stable JANINA 2/27/25 In Process





  21:46 


 


Acetaminophen Tablet PHA 2/27/25 In Process





 (Tylenol Tablet)  22:00 


 


Nitroglycerin PHA 2/27/25 In Process





Sublingual (Ntrostat  22:00 


 


Morphine Sulfate PHA 2/27/25 In Process





Injection  22:00 


 


Oxygen By Nasal RT 2/27/25 Transmitted





Cannula  21:46 


 


Stat Ekg For Chest JANINA 2/27/25 In Process





Pain  21:46 


 


Notify Md Of Changes JANINA 2/27/25 In Process





From Base  21:46 


 


Telemetry Monitor For JANINA 2/27/25 In Process





24 Hours  21:46 


 


Emergency Dysrhythmia JANINA 2/27/25 In Process





Protocol  21:46 


 


Rhythm Strips Once JANINA 2/27/25 In Process





Every Shift  21:46 


 


Orthostatic Vital ED NURSING 2/27/25 Transmitted





Signs   


 


Ceftriaxone 1gm/50ml PHA 2/28/25 In Process





D5w (Rocephin)  21:00 


 


Urine Bacterial DOMINIC 2/27/25 In Process





Culture  23:25 


 


* Dietary Consult CONS 2/27/25 Transmitted





  23:46 


 


Hydralazine Injection PHA 2/28/25 Logged





 (Apresoline Inject  00:45 


 


Hydralazine Injection PHA 2/28/25 Logged





 (Apresoline Inject  00:45 


 


Levothyroxine Tablet PHA 2/28/25 Verified





 (Synthroid Tablet)  06:00 











Date of Service:  Feb 27, 2025


Billing Provider:  JANAE GRIFFIN MD


Common Visit Codes:  99223-INITIAL  INP/OBS CARE (HIGH)











LIA GAMINO RESIDENT            Feb 28, 2025 00:49


JANAE GRIFFIN MD            Feb 28, 2025 09:19

## 2025-02-28 NOTE — DVHPNRES
Progress Note


Date Seen:  Feb 28, 2025


Resident Creating Document:  GALINDO GRIGGS RESIDENT


Medical Necessity Reason


Pt with a Central, PICC or Fol:  No





Subjective


Review of Systems


HPI- Zac Putnam 54 years old female with a PMH of type 2 DM, HTN, 

hypothyroidism presented to the ED with the chief complaints of syncopal 

episode.  Patient reported since Sunday patient has been having mild nausea body

aches and tiredness but yesterday patient was walking and suddenly she fainted 

without any prodromal symptoms patient is exactly unable to recall how much time

she was fainted approximately 2 hours and hit her chin.  Patient also reported 

she has a history of fainting during her pregnancy at that time she has some 

prodromal symptoms but not this time.  Patient is more consent so visited ED. 

patient also reported she has been experiencing flu-like symptoms for 1 week.  

On my assessment patient denies fever, chest pain, shortness of breath, 

palpitations, nausea, vomiting, heterogeneous and other acute associated 

symptoms.  Patient's lab workup revealed potassium 3.4, TSH 59.48, free T4 0.41,

total T3 0.60.  Urinalysis revealed leukocyte esterase 3+, WBC 53, RBC 12, 

bacteria few.  CXR no acute evidence of disease.  CT head- No acute intracranial

process. Acute sinusitis.  EKG normal sinus rhythm, no acute ST or T-wave 

changes no heart block.  Orthostatic vitals negative for arthritic hypotension.








PMH:  Type 2 DM, HTN, hypothyroidism on levothyroxine 125 mg 


PSH:  Denies


Social history:  Lives with family.  Denies smoking, alcohol and other drug 

abuse 


Family history:  Reviewed, noncontributory


Allergies:  No known allergies 


Home medications:  Hydrochlorothiazide, amlodipine but unknown doses.  

Levothyroxine 125 mg





 


Patient was seen today at the bedside. 


Cardiovascular- deny acute chest pain or shortness of breath or cough or 

palpitation


Respiratory denies cough or short of breath or wheezing


Gastrointestinal- denies any rectal bleeding, nausea or vomiting


Musculoskeletal-denies acute joint swelling or tenderness or redness


Neurological- denies acute dysarthria, dysphagia, change in vision


Psychiatry- denies depression or SI or HI


Skin- denies   acute rash or purpura





Patient was seen today for clinical evaluation.  Labs and chart reviewed.  

Patient was restarted on levothyroxine 125 mcg daily.  Pending echo 2D report.  

Orthostatic vital negative.  On ceftriaxone 1 g IV daily for complicated UTI.  

Negative for [place] Hallpike maneuver,





Objective


vital signs





                                   Vital Sign








  Date Time  Temp Pulse Resp B/P (MAP) Pulse Ox O2 Delivery O2 Flow Rate FiO2


 


2/28/25 13:00 98.1 83 18 143/79 (100) 95   





 98.1       


 


2/27/25 22:57      Room Air* 0 21














                           Total Intake and Output   


 


 2/27/25 2/27/25 2/28/25





 15:00 23:00 07:00


 


Intake Total  1000 ml 1070 ml


 


Balance  1000 ml 1070 ml








medications





                               Current Medications








 Medications  Dose


 Ordered  Sig/Crystal


 Route  Start Time


 Stop Time Status Last Admin


Dose Admin


 


 Enoxaparin Sodium  40 mg  DAILY


 SC  2/28/25 10:00


    2/28/25 09:06


40 MG


 


 Acetaminophen  650 mg  Q6HP  PRN


 PO  2/27/25 22:00


     





 


 Nitroglycerin  0.4 mg  Q5MINP  PRN


 SL  2/27/25 22:00


     





 


 Morphine Sulfate  2 mg  Q30M  PRN


 IV  2/27/25 22:00


     





 


 Ceftriaxone Sodium  50 ml @ 


 100 mls/hr  DAILY@2100


 IV  2/28/25 21:00


     





 


 Hydralazine HCl  10 mg  Q6HP  PRN


 IV  2/28/25 00:45


     





 


 Sodium Chloride  1,000 ml @ 


 60 mls/hr  [id number]


 IV  2/28/25 05:30


   Hold 2/28/25 06:08


60 MLS/HR


 


 Levothyroxine


 Sodium  150 mcg  QAM@0600


 PO  3/1/25 06:00


   Cancel  





 


 Levothyroxine


 Sodium  125 mcg  QAM@0600


 PO  3/1/25 06:00


     





 


 Diagnostic Test


  (Pha)  1 strip  IQ4HR


 VI  2/28/25 12:00


    2/28/25 14:50


1 STRIP


 


 Insulin Human


 Regular    IQ4HR


 SC  2/28/25 12:00


    2/28/25 14:53


3 UNITS


 


 Dextrose  50 ml  UD  PRN


 IV  2/28/25 12:00


     











Examination


General examination-awake, alert oriented, conversant


HEENT- PEERLA, no acute nasal discharge


Cardiovascular- S1-S2 audible, rate and rhythm regular,  no murmur


Respiratory- CTAB, no wheeze or rhonchi


Gastrointestinal-nontender,  bowel sound+.  Nondistended


Musculoskeletal-no acute joint swelling or tenderness or redness#


Lower extremity- no leg edema


Neurological- cranial nerves intact, no acute dysarthria or dysphagia


Psychiatry- denies depression or SI or HI


Skin- no acute rash or purpura


laboratory and microbiology


                                Laboratory Tests


2/28/25 05:40

















Test


 2/28/25


05:40 Range/Units


 


 


Serum Glucose 165 H   mg/dL











Problem List/Assessment/Plan


Problem List/Assessment/Plan


# Syncope


# ? [employer]  Imbalance


# mechanical fall with a LOC


-CT showed no acute changes


-monitor on telemetry


-currently on IVF


-orthostatic vitals negative


-Negative for [place] Hallpike maneuver,





# acute sinusitis


-clinical and CT evident


-currently on Rocephin





# Acute complicated UTI


- evident on urinalysis


- ordered urine bacterial culture


- currently giving Rocephin





# hypertensive urgency


-closely monitor


-hydralazine p.r.n. 10 mg until patient gets home meds





# hypothyroidism


-elevated TSH and low free T4


-levothyroxine 125 mg as home dose


-outpatient follow up to change thyroxine dose





# DM type 2


-continue insulin sliding scale as prescribed





# mild hypokalemia replenished


Monitor BMP











Goals of care/advance care planning; FULL CODE; discussed with the patient >15  

minutes





PUD prophylaxis: 


DVT prophylaxis:  Lovenox





Plan discussed with Dr. Galvan , nursing staff, patient


Total time spent on patient evaluation, chart review, assessment and plan, 

discussion  discussion >35  minutes


Plan discussed with:  Patient


Plan discussed with:  Patient, Spouse, Other (RN)





My Orders


My Orders





                         Orders - GALINDO GRIGGS








Procedure Category Date Status





  Time 


 


Levothyroxine Tablet PHA 3/1/25 In Process





 (Synthroid Tablet)  06:00 


 


Glucose Blood PHA 2/28/25 In Process





 (Accu-Chek Comfort  12:00 


 


Insulin R (Human) PHA 2/28/25 In Process





 (Insulin R)  12:00 


 


Dextrose 50% Syringe PHA 2/28/25 In Process





  12:00 











Dietary Evaluation Review


Comments:  


1. Consider changing diet order to CCHO 60 gm/meal given hyperglycemia





2. Continue frequent BG checks; goal >70, <180 mg/dl while inpatient





3. Encourage continued good oral intake of meals >75%


Expected Outcomes/Goals:  


Good glycemic control, adequate nutrition, weight maintenance











GALINDO GRIGGS RESIDENT         Feb 28, 2025 15:14

## 2025-03-01 VITALS
RESPIRATION RATE: 18 BRPM | DIASTOLIC BLOOD PRESSURE: 74 MMHG | HEART RATE: 70 BPM | OXYGEN SATURATION: 97 % | SYSTOLIC BLOOD PRESSURE: 117 MMHG | TEMPERATURE: 98.3 F

## 2025-03-01 VITALS
HEART RATE: 73 BPM | TEMPERATURE: 97.9 F | OXYGEN SATURATION: 97 % | DIASTOLIC BLOOD PRESSURE: 80 MMHG | RESPIRATION RATE: 18 BRPM | SYSTOLIC BLOOD PRESSURE: 151 MMHG

## 2025-03-01 VITALS — OXYGEN SATURATION: 99 % | RESPIRATION RATE: 16 BRPM

## 2025-03-01 VITALS
OXYGEN SATURATION: 96 % | HEART RATE: 62 BPM | TEMPERATURE: 98.2 F | DIASTOLIC BLOOD PRESSURE: 75 MMHG | RESPIRATION RATE: 17 BRPM | SYSTOLIC BLOOD PRESSURE: 133 MMHG

## 2025-03-01 VITALS
RESPIRATION RATE: 16 BRPM | TEMPERATURE: 97.6 F | SYSTOLIC BLOOD PRESSURE: 157 MMHG | DIASTOLIC BLOOD PRESSURE: 90 MMHG | OXYGEN SATURATION: 99 % | HEART RATE: 59 BPM

## 2025-03-01 VITALS
SYSTOLIC BLOOD PRESSURE: 152 MMHG | TEMPERATURE: 97.9 F | DIASTOLIC BLOOD PRESSURE: 97 MMHG | OXYGEN SATURATION: 97 % | RESPIRATION RATE: 17 BRPM | HEART RATE: 73 BPM

## 2025-03-01 VITALS
RESPIRATION RATE: 18 BRPM | OXYGEN SATURATION: 97 % | HEART RATE: 76 BPM | SYSTOLIC BLOOD PRESSURE: 138 MMHG | DIASTOLIC BLOOD PRESSURE: 81 MMHG | TEMPERATURE: 97.5 F

## 2025-03-01 VITALS — HEART RATE: 58 BPM

## 2025-03-01 LAB
ANION GAP SERPL CALCULATED.3IONS-SCNC: 9 MMOL/L (ref 5–15)
BUN SERPL-MCNC: 11 MG/DL (ref 9–23)
BUN/CREAT SERPL: 12 (ref 10–20)
CALCIUM SERPL-MCNC: 9.1 MG/DL (ref 8.7–10.4)
CHLORIDE SERPL-SCNC: 104 MMOL/L (ref 98–107)
CO2 SERPL-SCNC: 25 MMOL/L (ref 20–31)
GLUCOSE SERPL-MCNC: 157 MG/DL (ref 74–106)
POTASSIUM SERPL-SCNC: 3.8 MMOL/L (ref 3.5–5.1)
SODIUM SERPL-SCNC: 138 MMOL/L (ref 136–145)

## 2025-03-01 RX ADMIN — HYDRALAZINE HYDROCHLORIDE PRN MG: 20 INJECTION INTRAMUSCULAR; INTRAVENOUS at 15:39

## 2025-03-01 RX ADMIN — LEVOTHYROXINE SODIUM SCH MCG: 50 TABLET ORAL at 06:05

## 2025-03-01 NOTE — DVHDSRES
Discharge Summary


Date of Admission


Resident Creating Document:  GALINDO GRIGGS RESIDENT


2025 at 21:46





Date of Discharge:


Mar 1, 2025





Admitting Diagnosis


Syncope, rule out TIA/orthostatic hypotension/BPPV/dehydration





Labs/Diagnostic Data:





                               Laboratory Results








Test


 3/1/25


08:32 3/1/25


06:21 25


05:40 25


21:57


 


POC Glucose


 149 mg/dl


() 


 


 





 


Sodium Level


 


 138 mmol/L


(136-145) 


 





 


Potassium Level


 


 3.8 mmol/L


(3.5-5.1) 


 





 


Chloride Level


 


 104 mmol/L


() 


 





 


Carbon Dioxide Level


 


 25 mmol/L


(20-31) 


 





 


Anion Gap  9 (5-15)   


 


Blood Urea Nitrogen


 


 11 mg/dL


(9-23) 


 





 


Creatinine


 


 0.92 mg/dL


(0.550-1.02) 


 





 


Glomerular Filtration Rate


Calc 


 73 mL/min


(>90) 


 





 


BUN/Creatinine Ratio


 


 12.0


(10.0-20.0) 


 





 


Serum Glucose


 


 157 mg/dL


() 


 





 


Calcium Level


 


 9.1 mg/dL


(8.7-10.4) 


 





 


White Blood Count


 


 


 5.5 10^3/uL


(4.4-10.8) 





 


Red Blood Count


 


 


 4.02 10^6/uL


(4.0-5.20) 





 


Hemoglobin


 


 


 12.5 g/dL


(12.2-16.2) 





 


Hematocrit


 


 


 36.8 %


(36.0-46.0) 





 


Mean Corpuscular Volume


 


 


 91.5 fL


(80.0-100.0) 





 


Mean Corpuscular Hemoglobin


 


 


 31.2 pg


(28.0-32.0) 





 


Mean Corpuscular Hemoglobin


Concent 


 


 34.1 g/dL


(32.0-36.0) 





 


Red Cell Distribution Width


 


 


 12.8 %


(11.8-14.3) 





 


Platelet Count


 


 


 174 10^3/uL


(140-450) 





 


Mean Platelet Volume


 


 


 9.4 fL


(6.9-10.8) 





 


Neutrophils (%) (Auto)


 


 


 51.7 %


(37.0-80.0) 





 


Lymphocytes (%) (Auto)


 


 


 39.3 %


(10.0-50.0) 





 


Monocytes (%) (Auto)


 


 


 7.1 %


(0.0-12.0) 





 


Eosinophils (%) (Auto)


 


 


 1.3 %


(0.0-7.0) 





 


Basophils (%) (Auto)


 


 


 0.6 %


(0.0-2.0) 





 


Neutrophils # (Auto)


 


 


 2.8 10 ^3/uL


(1.6-8.6) 





 


Lymphocytes # (Auto)


 


 


 2.2 10 ^3/uL


(0.4-5.4) 





 


Monocytes # (Auto)


 


 


 0.4 10 ^3/uL


(0-1.3) 





 


Eosinophils # (Auto)


 


 


 0.1 10 ^3/uL


(0-0.8) 





 


Basophils # (Auto)


 


 


 0 10 ^3/uL


(0-0.2) 





 


Nucleated Red Blood Cells   0.2 %  


 


Hemoglobin A1c


 


 


 8.4 % A1C


(<5.7) 





 


Total Bilirubin


 


 


 0.4 mg/dL


(0.2-1.0) 





 


Aspartate Amino Transferase


(AST) 


 


 23 U/L (13-40) 


 





 


Alanine Aminotransferase (ALT)   15 U/L (7-40)  


 


Alkaline Phosphatase


 


 


 42 U/L


() 





 


Total Protein


 


 


 6.7 g/dL


(5.7-8.2) 





 


Albumin


 


 


 4.1 g/dL


(3.2-4.8) 





 


Free Thyroxine (T4) Calculated


 


 


 


 0.41 ng/dL


(0.89-1.76)


 


Total Triiodothyronine (TT3)


 


 


 


 0.60 ng/mL


(0.60-1.81)


 


Plasma/Serum Blood Alcohol


 


 


 


 < 3.0 mg/dL


(<10)


 


Test


 25


21:20 25


18:00 25


15:31 25


15:16


 


Urine Color


 Yellow


(Yellow) 


 


 





 


Urine Clarity Turbid (Clear)    


 


Urine pH 6.0 (5.0-9.0)    


 


Urine Specific Gravity


 1.017


(1.001-1.035) 


 


 





 


Urine Protein


 Trace


(Negative) 


 


 





 


Urine Ketones


 Negative


(Negative) 


 


 





 


Urine Blood


 Negative /uL


(Negative) 


 


 





 


Urine Nitrite


 Negative


(Negative) 


 


 





 


Urine Bilirubin


 Negative


(Negative) 


 


 





 


Urine Urobilinogen


 Normal mg/dL


(Negative) 


 


 





 


Urine Leukocyte Esterase


 3+ /uL


(Negative) 


 


 





 


Urine RBC


 12 /hpf (0 -


4) 


 


 





 


Urine Microscopic WBC 53 /HPF (0-5)    


 


Urine Squamous Epithelial


Cells Few /hpf (<5) 


 


 


 





 


Urine Bacteria


 Few /hpf (None


Seen) 


 


 





 


Urine Hyaline Casts


 Mod /lpf (0 -


2) 


 


 





 


Urine Mucus


 Few (None


Seen) 


 


 





 


Urine Glucose


 3+ mg/dL


(Normal) 


 


 





 


Urine Opiates Screen Neg (NEGATIVE)    


 


Urine Fentanyl Screen Neg (NEGATIVE)    


 


Urine Barbiturates Screen Neg (NEGATIVE)    


 


Urine Phencyclidine Screen Neg (NEGATIVE)    


 


Urine Amphetamines Screen Neg (NEGATIVE)    


 


Urine Benzodiazepines Screen Neg (NEGATIVE)    


 


Urine Cocaine Screen Neg (NEGATIVE)    


 


Urine Cannabinoids Screen Neg (NEGATIVE)    


 


D-Dimer, Quantitative


 


 0.23 mg/L FEU


(0.0-0.49) 


 





 


Troponin I High Sensitivity


 


 < 3 ng/L


(</=34) 


 





 


Thyroid Stimulating Hormone


(TSH) 


 59.48 uIU/mL


(0.55-4.78) 


 





 


Lactic Acid Level


 


 


 1.2 mmol/L


(0.4-2.0) 





 


Magnesium Level


 


 


 2.0 mg/dL


(1.6-2.6) 





 


B-Type Natriuretic Peptide


 


 


 8.04 pg/mL


(0-100) 





 


Influenza Type A Antigen


 


 


 


 Negative


(Negative)


 


Influenza Type B Antigen


 


 


 


 Negative


(Negative)


 


SARS-CoV-2 Antigen (Rapid)


 


 


 


 Negative


(NEGATIVE)





                             Other Laboratory Tests


3/1/25 06:21








25 05:40











Brief Hx & Hospital Course:


HPI- Putnam, Dalton 54 years old female with a PMH of type 2 DM, HTN, 

hypothyroidism presented to the ED with the chief complaints of syncopal 

episode.  Patient reported since  patient has been having mild nausea body

aches and tiredness but yesterday patient was walking and suddenly she fainted 

without any prodromal symptoms patient is exactly unable to recall how much time

she was fainted approximately 2 hours and hit her chin.  Patient also reported 

she has a history of fainting during her pregnancy at that time she has some 

prodromal symptoms but not this time.  Patient is more consent so visited ED. 

patient also reported she has been experiencing flu-like symptoms for 1 week.  

On my assessment patient denies fever, chest pain, shortness of breath, 

palpitations, nausea, vomiting, heterogeneous and other acute associated 

symptoms.  Patient's lab workup revealed potassium 3.4, TSH 59.48, free T4 0.41,

total T3 0.60.  Urinalysis revealed leukocyte esterase 3+, WBC 53, RBC 12, 

bacteria few.  CXR no acute evidence of disease.  CT head- No acute intracranial

process. Acute sinusitis.  EKG normal sinus rhythm, no acute ST or T-wave 

changes no heart block.  Orthostatic vitals negative for arthritic hypotension. 

Echo 2D revealed LV EF 60%, concentric LVH.  Mild TR.  Mild aortic root 

enlargement with dilatation of the sinus of the Valsalva.








Hospital voice-patient came to the hospital with a complaint of syncope and 

mechanical fall.  Patient is admitted to the hospital due to syncope, rule out 

TIA/BPPV/vital cardiac arrhythmia/dehydration.Patient's lab workup revealed 

potassium 3.4, TSH 59.48, free T4 0.41, total T3 0.60.  Urinalysis revealed 

leukocyte esterase 3+, WBC 53, RBC 12, bacteria few.  CXR no acute evidence of 

disease.  CT head- No acute intracranial process. Acute sinusitis.  EKG normal 

sinus rhythm, no acute ST or T-wave changes no heart block.  Orthostatic vitals 

negative for arthritic hypotension.  Echo 2D revealed LV EF 60%, concentric LVH.

 Mild TR.  Mild aortic root enlargement with dilatation of the sinus of the 

Valsalva.  Orthostatic vitals negative for orthostatic hypotension.  Abdirahman-

Hallpike maneuver was negative.  Patient's symptoms improved gradually.  

Patient's levothyroxine was restarted.  Patient was advised to follow up with 

the primary care physician in 1 week.  PCP to refer patient to cardiologist for 

further evaluation and workup of syncope to rule out cardiac arrhythmia.  

Patient was advised to avoid dehydration.  Patient was hemodynamically stable on

discharge.  Patient was also advised to resume home medications.














Diagnosis


Syncope likely due to dehydration/hypovolemia


Rule out TIA


# ? Autonamic  Imbalance


# mechanical fall with a LOC


# acute sinusitis


-clinical and CT evident


-currently on Rocephin


# Acute complicated UTI


# hypothyroidism


# DM type 2


# mild hypokalemia replenished


No hypertensive urgency








Discharge plan


Please resume home medications


Please follow up with the primary care physician in 1 week


Please get a referral from your PCP to follow up with the cardiologist for the 

of syncope, rule out any cardiac arrhythmia


Please avoid dehydration


Please be compliant with medication





Operations or Procedures


                             [address] [address]


                              Ph: [phone]





                               DIAGNOSTIC IMAGING


                      Diagnostic Imaging Report : [id number]


                                     Signed








PATIENT: DALTON PUTNAM    ACCT: [id number]        UNIT: [id number]


: 1970           LOC: [facility]           ROOM / BED: [facility] / A


AGE / SEX: 54 / F         ADM STATUS: ADM IN        SERVICE DT: 25





ORDERING PHYSICIAN: LIA GAMINO RESIDENT


PROCEDURE(s): ECIDC - ECHO 2D MODE CARDIAC DOP


REASON: To rule out structural heart disease


ORDER NUMBER(s): [id number], ACCESSION NUMBER(s): [id number]








--------------- APPROVED REPORT --------------








EXAM: Two-dimensional and M-mode echocardiogram with Doppler and color Doppler.





Blood Pressure: 142/69 mmHg





INDICATION


R/O structural heart disease





RISK FACTORS


Height: 5'1", Weight: 146





DIMENSIONS 


LVDd   3.4 (3.8-5.7cm)   LA (2D)      3.0 (1.9-4.0cm)   Aortic Root   3.1 (2.0-

3.7cm)


LVDs   1.6 (2.5-4.0cm)   LA (MM)       (1.9-4.0cm)   Aortic Cusp Exc   1.7 (1.5-

2.0cm)


EF (%)    84.0 (55-70%)   Rt. Atrium   2.8 (1.9-4.0cm)   Asc. Aorta   3.2 cm


IVSd   1.3 (0.7-1.1cm)   RV (D)      3.0 (1.8-2.4cm)         


PWd   1.1 (0.7-1.1cm)                     





Mitral Valve


Mitral            Mitral Stenosis      


E wave      0.66m/s      MV Mean GR.   mmHg   


A wave      0.79m/s      MV Peak GR.   mmHg   


E/A ratio   0.8      2D MVA      cm2   


DECEL Time   186ms      PRESS 1/2 Time   ms   





Aortic Valve


Aortic Valve            Aortic Stenosis            


V1      0.93m/s         AO Mean GR.   7mmHg         


V2      2.07m/s         AO Peak GR.   17mmHg         


LVOT Diameter   2.0 (1.8-2.4cm)      Doppler BRETT   1.41cm2         





Pulmonic Valve


V2   0.76m/s                  





Tricuspid Valve


TR Velocity   2.13m/s                  


RVSP      21mmHg                  





Conclusion


Technically good study.  Sinus rhythm.  


Mild aortic root enlargement with dilation of the sinuses of Valsalva.  Notable 

concentric LVH.


The mitral and aortic valves are otherwise structurally normal.  The tricuspid 

and pulmonic are normal.


Left ventricular systolic performance is preserved at 60% with normal RV 

function.  


Mild TR.  


No pericardial effusion masses or vegetations.








SIGNED BY: SANDOR RODRIGUES Sr., MD


SIGNED DATE/TIME: 25 0592





CC: 





























                             [address] [address]


                              Ph: [phone]





                               DIAGNOSTIC IMAGING


                      Diagnostic Imaging Report : [id number]


                                     Signed








PATIENT: DALTON PUTNAM    ACCT: [id number]        UNIT: [id number]


: 1970           LOC: ER                   ROOM / BED:  / 


AGE / SEX: 54 / F         ADM STATUS: REG ER        SERVICE DT: 25 1506





ORDERING PHYSICIAN: MIRTHA VARGAS DO


PROCEDURE(s): HWOCT - HEAD WITHOUT CONTRAST


REASON: SYNCOPE AND COLLAPSE


ORDER NUMBER(s): [id number], ACCESSION NUMBER(s): [id number]








EXAM: CT HEAD WITHOUT CONTRAST





HISTORY: SYNCOPE AND COLLAPSE





COMPARISON: None





TECHNIQUE: Axial images were obtained and reformatted in coronal and sagittal 

planes.





All CT scans at this medical facility are performed using dose modulation 

techniques as appropriate to a performed exam including the following: Automated

exposure control was utilized; adjustment of the MA and/or KV according to 

patient size; and use of iterative reconstruction technique. 





CT Dose: CTDI volume is 51.58 mGy. Dose-length product is 826.95 mGy*cm





FINDINGS:





Supratentorial Region:  No evidence for large acute territorial ischemia.  No 

intracranial hemorrhage is noted. 





Posterior Fossa:  No acute abnormality.





Brainstem:  Unremarkable.





Sellar/Suprasellar Region:  Mild diffuse paranasal sinus mucosal thickening.  

Air-fluid levels are noted in the frontal, sphenoid and maxillary sinuses.





Ventricles, Cisterns, Sulci:  Age-appropriate.





Orbits:  Unremarkable.





Paranasal Sinuses:  Unremarkable.





Mastoid Air Cells:  Unremarkable.





Vasculature:  Intracranial arterial calcified plaque formation noted.





Bones/Soft Tissues:  No acute abnormality.





Other:  None.





IMPRESSION: 





1. No acute intracranial process.





2. Acute sinusitis.





Electronically Signed by: Asia Barrera at 2025 16:14:02 PM











DICTATED BY: ASIA FELDMAN MD


DICTATED DATE/TIME: 25 1614





SIGNED BY: ASIA FELDMAN MD


SIGNED DATE/TIME: 25 1614





CC:





Condition at Discharge:


Stable





Final Diagnosis/Problems List





Syncope likely due to volume depletion/suspected autonomic neuropathy





Ruled out TIA





Ruled out orthostatic hypotension





Discharge Disposition:


Home





Discharge Instruct/Medications


Diet:  Consistent carbohydrate, Cardiac 2g Na,low cholest


Activity:  Light activity


Follow Up/Referral:  





Please follow up with the primary care physician in 1 week


Please get a referral from your PCP to follow up with the cardiologist for


the of syncope, rule out any cardiac arrhythmia


Please avoid dehydration


Please be compliant with medication


Medications:  


Please resume home medications





Avoid dehydration


Discharge Statement:


"Patient was advised to return to the ER or call 911 if any headaches, 

dizziness, shortness of breath, chest pain, abdominal pain, bleeding, fevers, or

worsening of medical condition.





Patient was counseled about treatment plan, medications, possible side effects, 

patientverbalized understanding. All questions were answered to the best of my 

ability.





This discharge took greater then 30 minutes in planning, reviewing 

documentation, counseling the patient, and discussing with other team members."





ASSESSMENT


Syncope likely due to volume depletion/suspected autonomic neuropathy


Ruled out TIA


Ruled out orthostatic hypotension











GALINDO GRIGGS RESIDENT          Mar 1, 2025 14:28